# Patient Record
Sex: FEMALE | Race: WHITE
[De-identification: names, ages, dates, MRNs, and addresses within clinical notes are randomized per-mention and may not be internally consistent; named-entity substitution may affect disease eponyms.]

---

## 2020-10-14 ENCOUNTER — HOSPITAL ENCOUNTER (EMERGENCY)
Dept: HOSPITAL 72 - EMR | Age: 30
Discharge: HOME | End: 2020-10-14
Payer: MEDICAID

## 2020-10-14 VITALS — WEIGHT: 145 LBS | HEIGHT: 66 IN | BODY MASS INDEX: 23.3 KG/M2

## 2020-10-14 VITALS — SYSTOLIC BLOOD PRESSURE: 111 MMHG | DIASTOLIC BLOOD PRESSURE: 69 MMHG

## 2020-10-14 VITALS — DIASTOLIC BLOOD PRESSURE: 78 MMHG | SYSTOLIC BLOOD PRESSURE: 115 MMHG
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
## 2020-10-14 DIAGNOSIS — S09.90XA: Primary | ICD-10-CM

## 2020-10-14 DIAGNOSIS — Z91.040: ICD-10-CM
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
## 2020-10-14 DIAGNOSIS — Z88.0: ICD-10-CM

## 2020-10-14 DIAGNOSIS — M54.2: ICD-10-CM

## 2020-10-14 DIAGNOSIS — F07.81: ICD-10-CM

## 2020-10-14 PROCEDURE — 99284 EMERGENCY DEPT VISIT MOD MDM: CPT

## 2020-10-14 PROCEDURE — 70450 CT HEAD/BRAIN W/O DYE: CPT

## 2020-10-14 NOTE — DIAGNOSTIC IMAGING REPORT
Indications: Head pain, injury, status post fall

 

Technique: Spiral acquisitions obtained through the brain. Angled axial and coronal 5

x 5 mm slices were reconstructed. Total dose length product 1018 mGycm.  CTDI vol(s)

53 mGy. Dose reduction achieved using automated exposure control

 

Comparison: None.

 

Findings: No acute intracranial hemorrhage or edema. No mass effect nor midline

shift. Normal gray-white differentiation. Normal size ventricles and extra-axial CSF

spaces. Visualized orbits and sinuses are unremarkable. The mastoids are clear. The

calvarium is intact

 

Impression: Negative

 

 

 

 

 

The CT scanner at [facility] is accredited by the [employer]

[employer] and the scans are performed using protocols designed to limit radiation

exposure to as low as reasonably achievable to attain images of sufficient resolution

adequate for diagnostic evaluation.
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
